# Patient Record
Sex: FEMALE
[De-identification: names, ages, dates, MRNs, and addresses within clinical notes are randomized per-mention and may not be internally consistent; named-entity substitution may affect disease eponyms.]

---

## 2019-04-05 LAB
BASOPHILS # BLD AUTO: 0 10*3/UL (ref 0–0.1)
BASOPHILS NFR BLD AUTO: 0.5 % (ref 0–1)
DEPRECATED NEUTROPHILS # BLD AUTO: 4.6 10*3/UL (ref 2.1–6.9)
EOSINOPHIL # BLD AUTO: 0.1 10*3/UL (ref 0–0.4)
EOSINOPHIL NFR BLD AUTO: 1.4 % (ref 0–6)
ERYTHROCYTE [DISTWIDTH] IN CORD BLOOD: 14.2 % (ref 11.7–14.4)
HCT VFR BLD AUTO: 46.1 % (ref 34.2–44.1)
HGB BLD-MCNC: 15 G/DL (ref 12–16)
LYMPHOCYTES # BLD: 2.1 10*3/UL (ref 1–3.2)
LYMPHOCYTES NFR BLD AUTO: 27.9 % (ref 18–39.1)
MCH RBC QN AUTO: 31.7 PG (ref 28–32)
MCHC RBC AUTO-ENTMCNC: 32.5 G/DL (ref 31–35)
MCV RBC AUTO: 97.5 FL (ref 81–99)
MONOCYTES # BLD AUTO: 0.6 10*3/UL (ref 0.2–0.8)
MONOCYTES NFR BLD AUTO: 7.9 % (ref 4.4–11.3)
NEUTS SEG NFR BLD AUTO: 61.9 % (ref 38.7–80)
PLATELET # BLD AUTO: 206 X10E3/UL (ref 140–360)
RBC # BLD AUTO: 4.73 X10E6/UL (ref 3.6–5.1)

## 2019-04-09 ENCOUNTER — HOSPITAL ENCOUNTER (OUTPATIENT)
Dept: HOSPITAL 88 - OR | Age: 64
Discharge: HOME | End: 2019-04-09
Attending: INTERNAL MEDICINE
Payer: COMMERCIAL

## 2019-04-09 VITALS — SYSTOLIC BLOOD PRESSURE: 133 MMHG | DIASTOLIC BLOOD PRESSURE: 86 MMHG

## 2019-04-09 DIAGNOSIS — D12.2: ICD-10-CM

## 2019-04-09 DIAGNOSIS — K44.9: ICD-10-CM

## 2019-04-09 DIAGNOSIS — I10: ICD-10-CM

## 2019-04-09 DIAGNOSIS — K21.0: ICD-10-CM

## 2019-04-09 DIAGNOSIS — K64.8: ICD-10-CM

## 2019-04-09 DIAGNOSIS — Z01.810: ICD-10-CM

## 2019-04-09 DIAGNOSIS — D12.4: ICD-10-CM

## 2019-04-09 DIAGNOSIS — E78.5: ICD-10-CM

## 2019-04-09 DIAGNOSIS — J44.9: ICD-10-CM

## 2019-04-09 DIAGNOSIS — K29.80: ICD-10-CM

## 2019-04-09 DIAGNOSIS — K29.70: Primary | ICD-10-CM

## 2019-04-09 DIAGNOSIS — K57.30: ICD-10-CM

## 2019-04-09 DIAGNOSIS — Z01.812: ICD-10-CM

## 2019-04-09 PROCEDURE — 93005 ELECTROCARDIOGRAM TRACING: CPT

## 2019-04-09 PROCEDURE — 36415 COLL VENOUS BLD VENIPUNCTURE: CPT

## 2019-04-09 PROCEDURE — 45385 COLONOSCOPY W/LESION REMOVAL: CPT

## 2019-04-09 PROCEDURE — 85025 COMPLETE CBC W/AUTO DIFF WBC: CPT

## 2019-04-09 PROCEDURE — 43239 EGD BIOPSY SINGLE/MULTIPLE: CPT

## 2019-04-09 PROCEDURE — 45384 COLONOSCOPY W/LESION REMOVAL: CPT

## 2019-04-09 NOTE — XMS REPORT
Clinical Summary

                             Created on: 2019



German Worthington

External Reference #: [id number]

: 1955

Sex: Female



Demographics







                          Address                   [address] [place]  [address]

 

                          Home Phone                [phone]

 

                          Preferred Language        English

 

                          Marital Status            Single

 

                          Rastafarian Affiliation     Unknown

 

                          Race                      White

 

                          Ethnic Group              Non-





Author







                          Author                    [facility]

 

                          Organization              [facility]

 

                          Address                   Unknown

 

                          Phone                     Unavailable







Support







                Name            Relationship    Address         Phone

 

                Cori Parsons            Unknown         [phone]







Care Team Providers







                    Care Team Member Name    Role                Phone

 

                    Provider, Unknown    PCP                 Unavailable







Allergies

No Known Allergies



Medications







                          End Date                  Status



              Medication     Sig          Dispensed     Refills      Start  



                                         Date  

 

                                                    Active



                     simvastatin (ZOCOR) 40 MG     Take 40 mg by       0   



                           tablet                    mouth every     



                                         evening.     

 

                                                    Active



                     aspirin (ECOTRIN) 81 MG     Take 81 mg by       0   



                           enteric coated tablet     mouth daily.     

 

                                                    Active



                     levothyroxine (SYNTHROID,     Take 137 mcg        0   



                           LEVOXYL) 137 mcg tablet     by mouth     



                                         daily.     

 

                                                    Active



                     hydroCHLOROthiazide     Take 12.5 mg        0   



                           (MICROZIDE) 12.5 mg       by mouth     



                           capsule                   daily.     

 

                          10/22/2018                Discontinued



                     methylPREDNISolone     Take 4 mg by        0   



                           (MEDROL DOSEPAK) 4 mg     mouth 2 (two)     



                           tablet                    times a day.     



                                         follow     



                                         package     



                                         directions     

 

                          10/22/2018                Discontinued



                     albuterol (PROAIR     Inhale 2            0   



                           HFA,PROVENTIL             puffs every 4     



                           HFA,VENTOLIN HFA) 90      (four) hours     



                           mcg/actuation inhaler     as needed for     



                                         wheezing.     

 

                          10/22/2018                Discontinued



                     cefdinir (OMNICEF) 300 MG     Take 300 mg         0   



                           capsule                   by mouth 2     



                                         (two) times a     



                                         day.     

 

                          10/22/2018                Discontinued



                     azithromycin (ZITHROMAX)     Take 250 mg         0   



                           250 MG tablet             by mouth     



                                         daily. Take 2     



                                         tablets the     



                                         first day,     



                                         then 1 tablet     



                                         daily for 4     



                                         days.     

 

                          2018                



              tiotropium (SPIRIVA) 18     Place 1 puff     30 capsule     2            10/23/201  



                     mcg per inhalation     (1 capsule          8  



                           capsule                   total) into     



                                         inhaler and     



                                         inhale once     



                                         daily for 30     



                                         days.     

 

                          2018                



              nicotine (NICODERM CQ) 21     Place 1 patch     30 patch     2            10/22/201  



                     mg/24 hr            on the skin         8  



                                         daily for 30     



                                         days.     

 

                          10/27/2018                



              methylPREDNISolone     follow       21 tablet     0            10/22/201  



                     (MEDROL, YISEL,) 4 mg     package             8  



                           tablet                    directions     

 

                          10/29/2018                



              levoFLOXacin (LEVAQUIN)     Take 1 tablet     7 tablet     0            10/22/201  



                     500 MG tablet       (500 mg             8  



                                         total) by     



                                         mouth daily     



                                         for 7 days.     

 

                          2018                



              fluticasone-vilanterol     Inhale 1     30 each      2            10/22/201  



                     (BREO ELLIPTA) 100-25     inhalations         8  



                           mcg/dose blister with     daily for 30     



                           device powder for         days.     



                                         inhalation      

 

                          2018                



              albuterol (PROAIR     Inhale 2     18 g         2            10/22/201  



                     HFA,PROVENTIL       puffs every 6       8  



                           HFA,VENTOLIN HFA) 90      (six) hours     



                           mcg/actuation inhaler     as needed for     



                                         wheezing for     



                                         up to 30     



                                         days.     







Active Problems







 



                           Problem                   Noted Date

 

 



                           COPD (chronic obstructive pulmonary disease)     10/20/2018







Encounters







                          Care Team                 Description



                     Date                Type                Specialty  

 

                                        



Sylvie Chowdhury MD                   



                     10/20/2018          [facility]  



                           -                         Encounter   



                                         10/22/2018    



after 2018



Immunizations







  



                     Name                Dates Previously Given     Next Due

 

  



                           FLUCELVAX QUAD PF (0.5mL     10/22/2018 (Deferred: ) 



                                         syringe)  







Social History







                                        Date



                 Tobacco Use     Types           Packs/Day       Years Used 

 

                                         



                     Current Every Day Smoker     Cigarettes          1  

 

    



                                         Smokeless Tobacco: Never   



                                         Used   









                                        Tobacco Cessation: Counseling Given: Yes











   



                 Alcohol Use     Drinks/Week     oz/Week         Comments

 

   



                                         No   









 



                           Sex Assigned at Birth     Date Recorded

 

 



                                         Not on file 









                                        Industry



                           Job Start Date            Occupation 

 

                                        Not on file



                           Not on file               Not on file 









                                        Travel End



                           Travel History            Travel Start 

 





                                         No recent travel history available.







Last Filed Vital Signs







                                        Time Taken



                           Vital Sign                Reading 

 

                                        10/22/2018  3:22 PM CDT



                           Blood Pressure            141/89 

 

                                        10/22/2018  3:22 PM CDT



                           Pulse                     80 

 

                                        10/22/2018  3:22 PM CDT



                           Temperature               36.7 C (98 F) 

 

                                        10/22/2018  3:22 PM CDT



                           Respiratory Rate          18 

 

                                        10/22/2018  3:22 PM CDT



                           Oxygen Saturation         96% 

 

                                        -



                           Inhaled Oxygen            - 



                                         Concentration  

 

                                        10/20/2018  8:24 PM CDT



                           Weight                    93 kg (205 lb) 

 

                                        10/20/2018  8:24 PM CDT



                           Height                    154.9 cm (5' 1") 

 

                                        10/20/2018  8:24 PM CDT



                           Body Mass Index           38.73 







Plan of Treatment







   



                 Health Maintenance     Due Date        Last Done       Comments

 

   



                           CERVICAL CANCER SCREENING     1977  

 

   



                           BREAST CANCER SCREENING     2006  

 

   



                           COLON CANCER SCREENING     2006  

 

   



                           SHINGLES VACCINES (#1)     2006  

 

   



                           INFLUENZA VACCINE         2019  







Procedures







                                        Comments



                 Procedure Name     Priority        Date/Time       Associated Diagnosis 

 

                                        



Results for this procedure are in the results section.



                     ESTIMATED GFR       Routine             10/22/2018  



                                         6:05 AM CDT  

 

                                        



Results for this procedure are in the results section.



                     B NATRIURETIC PEPTIDE     Routine             10/22/2018  



                                         6:05 AM CDT  

 

                                        



Results for this procedure are in the results section.



                     HEMOGLOBIN A1C      Routine             10/22/2018  



                                         6:05 AM CDT  

 

                                        



Results for this procedure are in the results section.



                     BASIC METABOLIC PANEL     Routine             10/22/2018  



                                         6:05 AM CDT  

 

                                        



Results for this procedure are in the results section.



                     CBC HEMOGRAM        Routine             10/22/2018  



                                         6:05 AM CDT  

 

                                        



Results for this procedure are in the results section.



                     GRAM STAIN          Routine             10/21/2018  



                                         12:45 PM CDT  

 

                                        



Results for this procedure are in the results section.



                     SPUTUM CULTURE      Routine             10/21/2018  



                                         12:45 PM CDT  

 

                                        



Results for this procedure are in the results section.



                     ECHOCARDIOGRAM 2D     Routine             10/21/2018  



                           COMPLETE W MMODE SPECTRAL      12:24 PM CDT  



                                         COLOR DOPPLER ([id number])    

 

                                        



Results for this procedure are in the results section.



                     TROPONIN            Timed               10/21/2018  



                                         11:35 AM CDT  

 

                                        



Results for this procedure are in the results section.



                     RESPIRATORY PATHOGEN     Routine             10/21/2018  



                           PANEL                     10:38 AM CDT  

 

                                        



Results for this procedure are in the results section.



                     TROPONIN            Routine             10/21/2018  



                                         6:11 AM CDT  

 

                                        



Results for this procedure are in the results section.



                     ESTIMATED GFR       Routine             10/21/2018  



                                         6:11 AM CDT  

 

                                        



Results for this procedure are in the results section.



                     BASIC METABOLIC PANEL     Routine             10/21/2018  



                                         6:11 AM CDT  

 

                                        



Results for this procedure are in the results section.



                     CBC HEMOGRAM        Routine             10/21/2018  



                                         6:11 AM CDT  

 

                                        



Results for this procedure are in the results section.



                     TROPONIN            Routine             10/20/2018  



                                         8:14 PM CDT  



after 2018



Results

* Estimated GFR (10/22/2018  6:05 AM CDT)



Only the most recent of 2 results within the time period is included.





   



                 Component       Value           Ref Range       Performed At

 

   



                 Estimated GFR     >=90            mL/min/1.73 m2     [facility] DEPARTMENT OF



                           Comment:                  PATHOLOGY AND



                           CatergoryUnitsInte      GENOMIC MEDICINE



                                         rpretation  



                                         G1  



                                         >=90 Normal or high  



                                         G2  



                                         60-89Mildly decreased  



                                         [id number]  



                                         Mildly to moderately  



                                         decreased  



                                         [id number]  



                                         Moderately to severely  



                                         decreased  



                                         G4  



                                         15-29Severely  



                                         decreased  



                                         G5  



                                         <15Kidney failure  



                                         The eGFR was calculated using  



                                         the Chronic Kidney Disease  



                                         Epidemiology Collaboration  



                                         (CKD-EPI) equation.  



                                         Interpretation is based on  



                                         recommendations of the  



                                         National Kidney  



                                         Foundation-Kidney Disease  



                                         Outcomes Quality  



                                         Initiative (NKF-KDOQI)  



                                         published in 2014.  













                                         Specimen

 





                                         Plasma specimen









   



                 Performing Organization     Address         City/[facility]/Zipcode     Phone Number

 

   



                     [facility]     [address] [place] [address] 



                                         PATHOLOGY AND GENOMIC   



                                         MEDICINE   





* CBC hemogram (10/22/2018  6:05 AM CDT)



Only the most recent of 2 results within the time period is included.





   



                 Component       Value           Ref Range       Performed At

 

   



                 WBC             8.8             4.2 - 11.0 k/uL     [facility] DEPARTMENT OF



                                         PATHOLOGY AND



                                         GENOMIC MEDICINE

 

   



                 RBC             4.25            4.04 - 5.86 m/uL     [facility] DEPARTMENT OF



                                         PATHOLOGY AND



                                         GENOMIC MEDICINE

 

   



                 HGB             13.5            11.5 - 15.3 g/dL     [facility] DEPARTMENT OF



                                         PATHOLOGY AND



                                         GENOMIC MEDICINE

 

   



                 HCT             41.3            34.0 - 45.0 %     [facility] DEPARTMENT OF



                                         PATHOLOGY AND



                                         GENOMIC MEDICINE

 

   



                 MCV             97.2            80.0 - 98.0 fL     [facility] DEPARTMENT OF



                                         PATHOLOGY AND



                                         GENOMIC MEDICINE

 

   



                 MCH             31.8            27.0 - 34.0 pg     [facility] DEPARTMENT OF



                                         PATHOLOGY AND



                                         GENOMIC MEDICINE

 

   



                 MCHC            32.7            31.5 - 36.5 g/dL     [facility] DEPARTMENT OF



                                         PATHOLOGY AND



                                         GENOMIC MEDICINE

 

   



                 RDW - SD        45.4            37.0 - 51.0 fL     [facility] DEPARTMENT OF



                                         PATHOLOGY AND



                                         GENOMIC MEDICINE

 

   



                 MPV             11.5 (H)        7.4 - 10.4 fL     [facility] DEPARTMENT OF



                                         PATHOLOGY AND



                                         GENOMIC MEDICINE

 

   



                 Platelet count     287             150 - 400 k/uL     [facility] DEPARTMENT OF



                                         PATHOLOGY AND



                                         GENOMIC MEDICINE

 

   



                 Nucleated RBC     0.00            /100 WBC        [facility] DEPARTMENT OF



                                         PATHOLOGY AND



                                         GENOMIC MEDICINE













                                         Specimen

 





                                         Blood









   



                 Performing Organization     Address         City/[facility]/Zipcode     Phone Number

 

   



                     [facility]     [address] [place] [address] 



                                         PATHOLOGY AND GENOMIC   



                                         MEDICINE   





* B natriuretic peptide (10/22/2018  6:05 AM CDT)





   



                 Component       Value           Ref Range       Performed At

 

   



                 BNP             106 (H)         0 - 100 pg/mL     [facility] DEPARTMENT OF



                                         PATHOLOGY AND



                                         GENOMIC MEDICINE













                                         Specimen

 





                                         Blood









   



                 Performing Organization     Address         City/[facility]/Zipcode     Phone Number

 

   



                     [facility]     [address] [place] [address] 



                                         PATHOLOGY AND GENOMIC   



                                         MEDICINE   





* Hemoglobin A1c (10/22/2018  6:05 AM CDT)





   



                 Component       Value           Ref Range       Performed At

 

   



                 Hemoglobin A1C     6.2 (H)         4.0 - 6.0 %     [facility] DEPARTMENT OF



                           Comment:                  PATHOLOGY AND



                           ******************************      GENOMIC MEDICINE



                                         ***********************  



                                         Less than 6% -  



                                         Goal of therapy for Type II  



                                         Diabetes  



                                         Less than  



                                         7%-Goal of  



                                         therapy for Type I Diabetes  



                                         Less than  



                                         8%-Accepta  



                                         ble control for Type I or Type

  



                                           



                                         II Diabetes  



                                         Greater than  



                                         8%-Unacceptabl  



                                         e control; action indicated.  



                                           



                                         ([id number])  













                                         Specimen

 





                                         Blood









   



                 Performing Organization     Address         City/[facility]     Phone Number

 

   



                     [place] [address] [place] [address] 



                                         PATHOLOGY AND [facility]   



                                         MEDICINE   





* Basic metabolic panel (10/22/2018  6:05 AM CDT)



Only the most recent of 2 results within the time period is included.





   



                 Component       Value           Ref Range       Performed At

 

   



                 Sodium          140             135 - 150 mEq/L     [facility] DEPARTMENT OF



                                         PATHOLOGY AND



                                         GENOMIC MEDICINE

 

   



                 Potassium       3.7             3.5 - 5.0 mEq/L     [facility] DEPARTMENT OF



                                         PATHOLOGY AND



                                         GENOMIC MEDICINE

 

   



                 Chloride        99              98 - 112 mEq/L     [facility] DEPARTMENT OF



                                         PATHOLOGY AND



                                         GENOMIC MEDICINE

 

   



                 CO2             27              24 - 31 mmol/L     [facility] DEPARTMENT OF



                                         PATHOLOGY AND



                                         GENOMIC MEDICINE

 

   



                 Anion gap       14@ANIO         7 - 15 mEq/L     [facility] DEPARTMENT OF



                                         PATHOLOGY AND



                                         GENOMIC MEDICINE

 

   



                 BUN             22 (H)          7 - 18 mg/dL     [facility] DEPARTMENT OF



                                         PATHOLOGY AND



                                         GENOMIC MEDICINE

 

   



                 Creatinine      0.60            0.50 - 0.90 mg/dL     [facility] DEPARTMENT OF



                                         PATHOLOGY AND



                                         GENOMIC MEDICINE

 

   



                 Glucose         237 (H)         65 - 100 mg/dL     [facility] DEPARTMENT OF



                                         PATHOLOGY AND



                                         GENOMIC MEDICINE

 

   



                 Calcium         9.1             8.8 - 10.2 mg/dL     [facility] DEPARTMENT OF



                                         PATHOLOGY AND



                                         [facility] MEDICINE













                                         Specimen

 





                                         Plasma specimen









   



                 Performing Organization     Address         City/[facility]/Zipcode     Phone Number

 

   



                     [place] [address] [place] [address] 



                                         PATHOLOGY AND GENOMIC   



                                         MEDICINE   





* Sputum culture (10/21/2018 12:45 PM CDT)





   



                 Component       Value           Ref Range       Performed At

 

   



                     Sputum culture isolate     Normal oral madhu isolated.      [facility] DEPARTMENT OF



                           Comment:                  PATHOLOGY AND



                           Specimen Information      GENOMIC MEDICINE



                                         Specimen Source: Sputum  



                                         Specimen Site: Expectorated  













                                         Specimen

 





                                         Sputum - Expectorated









   



                 Performing Organization     Address         City/[facility]     Phone Number

 

   



                     [facility] DEPARTMENT OF     [address] [place] [address] 



                                         PATHOLOGY AND GENOMIC   



                                         MEDICINE   





* Gram stain (10/21/2018 12:45 PM CDT)





   



                 Component       Value           Ref Range       Performed At

 

   



                     Gram stain isolate     Few epithelial cells      [facility] DEPARTMENT OF



                           Rare WBC's                PATHOLOGY AND



                           Occasional Gram positive cocci      GENOMIC MEDICINE



                                         in pairs  



                                         Few Gram positive rods  



                                         Few Gram negative rods  



                                         Comment:  



                                         Specimen Information  



                                         Specimen Source: Sputum  



                                         Specimen Site: Expectorated  













                                         Specimen

 





                                         Sputum - Expectorated









   



                 Performing Organization     Address         City/[facility]     Phone Number

 

   



                     [facility] DEPARTMENT OF     [address] [place] [address] 



                                         PATHOLOGY AND GENOMIC   



                                         MEDICINE   





* Echocardiogram complete w contrast and 3D if needed (10/21/2018 12:24 PM CDT)





   



                 Component       Value           Ref Range       Performed At

 

   



                 Ao Root Diameter     3.31            cm              [facility]

 

   



                 AoV Area, Vmax     2.81            cm2             [facility]

 

   



                 AoV Area, VTI     2.77            cm2             [facility]

 

   



                 AoV Mean PG     5.78            mmHg            [facility]

 

   



                 AoV Peak PG     8.80            mmHg            [facility]

 

   



                 AoV Vmax        1.49            m/s             [facility]

 

   



                 AoV VTI         0.32            m               [facility]

 

   



                 IVS,d           0.87            cm              [facility]

 

   



                     IVS/LVPW,2D         0.98                [facility]

 

   



                 Left Atrium Dimension     3.26            cm              [facility]



                                         Anterior   

 

   



                 LV,d            5.10            cm              [facility]

 

   



                 LV EF,2D        69.93           %               [facility]

 

   



                 LV,s            3.42            cm              [facility]

 

   



                 LVOT area       3.43            cm2             [facility]

 

   



                 LVOT Diam,S     2.09            cm              [facility]

 

   



                 LVOT Vmax       1.27            m/s             [facility]

 

   



                 LVOT VTI        0.24            m               [facility]

 

   



                 LVPWD,d         0.88            cm              [facility]

 

   



                 RVOT Vmax       0.73            m/s             [facility]

 

   



                     MV E A ratio        1.02                [facility]

 

   



                 E wave decelartion time     156.69          msec            [facility]

 

   



                 MV Peak A Vitor     1.03            m/s             [facility]

 

   



                 MV valve area p 1/2     4.84            cm2             [facility]



                                         method   

 

   



                 MV Peak E Vitor     1.05            m/s             [facility]

 

   



                 MV stenosis pressure 1/2     45.44           ms              [facility]



                                         time   

 

   



                 AV LVOT peak gradient     5.87            mmHg            [facility]

 

   



                 Ao Root Diameter     3.31            cm              [facility]

 

   



                 MV mean gradient     1.84            mmHg            [facility]

 

   



                 LV SYS VOL      48.00           ml              [facility]

 

   



                 LV MUSTAFA VOL     123.79          ml              [facility]

 

   



                 LV SV Teich 2D     75.79           ml              [facility]

 

   



                 LV Vol s Teich PSAX     48.00           ml              [facility]

 

   



                 LVOT CO         7.39            l/min           [facility]

 

   



                 LVOT HR for LVOT CO     84.76           bpm             [facility]

 

   



                 MR peak grad     6.51            mmHg            [facility]

 

   



                 MV Vmax         1.28            m               [facility]

 

   



                 MV VTI Tips     0.27            m               [facility]

 

   



                 RVOT pk grad     2.11            mmHg            [facility]

 

   



                     AoV Vmn             1.14                [facility]

 

   



                     IVS s 2D            1.08                [facility]

 

   



                     LV FS Teich 2D      33.00               [facility]

 

   



                     MV AE ratio         0.98                [facility]

 

   



                     LV FS Cube 2D       33.00               [facility]

 

   



                     LVOT Vmn            0.75                [facility]

 

   



                 Aov area Vmn     2.54            cm2             [facility]

 

   



                 LVOT mean grad     2.64            mmHg            [facility]

 

   



                     MAX Pred HR         157.33              [facility]

 

   



                     85 of MPHR          133.73              [facility]

 

   



                 Calc MPHR       157.33          bpm             [facility]

 

   



                 IVS pct thck PLAX     24.85           %               [facility]

 

   



                 LV SV Cube 2D     92.74           ml              [facility]

 

   



                 LV vol d cube 2D     132.63          ml              [facility]

 

   



                 LV vol s cube 2D     39.88           ml              [facility]

 

   



                 LVPW pct thck PLAX     21.70           %               [facility]

 

   



                 LVPW s PLAX     1.07            cm              [facility]

 

   



                 MV Decel slope     6.71            m/s2            [facility]

 

   



                     Pred Exer Dur R1     7.62                [facility]

 

   



                     Pred METS R1        6.55                [facility]

 

   



                 LA Vol MOD A4C     37.87           ml              [facility]

 

   



                 Velocity Ratio (V1/V2)     0.85            m/s             [facility]

 

   



                 EF              61.22           %               [facility]

 

   



                     E/A ratio           1.02                [facility]









 



                           Narrative                 Performed At

 

 



                           This result has an attachment that is not available.     [facility]



                                          The left ventricular chamber size is normal. Left ventricular systolic 



                                         function is normal. Left Ventricular ejection fraction is 55 - 60%. Normal 



                                         left ventricular regional wall motion. 



                                          Normal right ventricular size and global function. 



                                          Spectral Doppler shows impaired relaxation pattern of LV diastolic 



                                         filling. Normal LV filling pressure. 



                                          No valvular lesions seen. 









   



                 Performing Organization     Address         City/State/Zipcode     Phone Number

 

   



                     [facility]            [address] [place] [address] 





* Troponin (10/21/2018 11:35 AM CDT)



Only the most recent of 3 results within the time period is included.





   



                 Component       Value           Ref Range       Performed At

 

   



                 Troponin        <0.30           0.00 - 0.30 ng/mL     [facility] DEPARTMENT OF



                           Comment:                  PATHOLOGY AND



                           0.11 - 1.49               GENOMIC MEDICINE



                                         ng/mlMay  



                                         indicate increased risk of  



                                         acute  



                                           



                                          coronary  



                                         syndrome.  



                                           



                                           



                                         >=1.5  



                                         ng/ml  



                                         Consistent with acute  



                                         myocardial  



                                           



                                          infarction.  



                                           



                                           



                                           



                                           



                                           



                                         The diagnostic value of a  



                                         single normal or  



                                         non-diagnostic  



                                         result is  



                                         questionable.Serial  



                                         samples at 2-6 hour intervals  



                                         are required to rule out acute  



                                         myocardial  



                                         injury.  



                                           



                                           













                                         Specimen

 





                                         Plasma specimen









   



                 Performing Organization     Address         City/State/Zipcode     Phone Number

 

   



                     [facility] DEPARTMENT OF     [address] [place] [address] 



                                         PATHOLOGY AND GENOMIC   



                                         MEDICINE   





* Respiratory pathogen panel (10/21/2018 10:38 AM CDT)





   



                 Component       Value           Ref Range       Performed At

 

   



                     Respiratory pathogen     Negative for all pathogens      [facility] DEPARTMENT OF



                     panel               tested:             PATHOLOGY AND



                           Negative for Adenovirus      GENOMIC MEDICINE



                                         Negative for Coronavirus HKU1  



                                         Negative for Coronavirus NL63  



                                         Negative for Coronavirus 229E  



                                         Negative for Coronavirus OC43  



                                         Negative for Human  



                                         Metapneumovirus  



                                         Negative for  



                                         Rhinovirus/Enterovirus  



                                         Negative for Influenza A  



                                         Negative for Influenza A/H1  



                                         Negative for Influenza A/H3  



                                         Negative for Influenza  



                                         A/H1-2009  



                                         Negative for Influenza B  



                                         Negative for Parainfluenza  



                                         Virus 1  



                                         Negative for Parainfluenza  



                                         Virus 2  



                                         Negative for Parainfluenza  



                                         Virus 3  



                                         Negative for Parainfluenza  



                                         Virus 4  



                                         Negative for Respiratory  



                                         Syncytial Virus  



                                         Negative for Bordetella  



                                         pertussis  



                                         Negative for Chlamydophila  



                                         pneumoniae  



                                         Negative for Mycoplasma  



                                         pneumoniae  



                                         This real-time PCR assay  



                                         detects the presence of  



                                         nucleic  



                                         acids (RNA or DNA) for the  



                                         respiratory pathogens  



                                         listed.  



                                         A result of "Not-detected"  



                                         does not exclude the  



                                         possibility  



                                         of the presence of one or more  



                                         pathogens at concentrations  



                                         less than the detectable  



                                         limits of the assay.  



                                         Comment:  



                                         Specimen Information  



                                         Specimen Source: Nares  



                                         Specimen Site: Other  













                                         Specimen

 





                                         Nares - Other- Detailed



                                         Description Required









   



                 Performing Organization     Address         City/State/Zipcode     Phone Number

 

   



                     [facility] DEPARTMENT OF     [address] [place] [address] 



                                         PATHOLOGY AND GENOMIC   



                                         MEDICINE   





after 2018



Insurance







     



            Payer      Benefit     Subscriber ID     Type       Phone      Address



                                         Plan /    



                                         Group    

 

     



                 PAULINO EXCHANGE     PAULINO          xxxxxxxxxx      Exchange  



                                         [employer]    



                                         E EXCHANGE    









     



            Guarantor Name     Account     Relation to     Date of     Phone      Billing Address



                     Type                Patient             Birth  

 

     



            German Worthington     Personal/F     Self       1955     [phone]     [address]               (Home)              [place] [address]







Advance Directives





Patient has advance care planning documents on file. For more information, deedee
e contact:



[address] [place] [address]

## 2020-11-18 NOTE — XMS REPORT
[facility] Services Summary

                             Created on: 2019



German Worthington

External Reference #: [id number]

: 1955

Sex: Female



Demographics







                          Address                   [address] [place]  [address]

 

                          Home Phone                [phone]

 

                          Preferred Language        English

 

                          Marital Status            M

 

                          Restorationism Affiliation     Unknown

 

                          Race                      Unknown

 

                          Ethnic Group              Unknown





Author







                          Author                    Admin, Dumont

 

                          Organization              [facility]

 

                          Address                   [address]

[place]  [address]



 

                          Phone                     [phone]







Allergies, Adverse Reactions, Alerts







           Allergy Name    Reaction Description    Start Date    Severity    Status     Provider

 

           No Known Allergies                                         Thu Carrero CMA







Conditions or Problems







        Problem Name    Problem Code    Onset Date    Status    Entry Date    Provider    Comment    Standard

 Description                            Annotate

 

           Abnormal mammogram, right breast    793.80         Active         Fredrick Laura MD                               Abnormal mammogram, unspecified     

 

          Allergic rhinitis    477.9         Active        Fredrick Laura MD      

                          Allergic rhinitis, cause unspecified     

 

        Annual exam    V70.0        Active        Fredrick Laura MD            Routine

 general medical examination at a health care facility     

 

           Colon Cancer Screening    V76.51         Active         Fredrick Laura MD                                      Screening for malignant neoplasms of colon     

 

           Mammogram, Screening    V76.12         Active         Fredrick Laura MD

                                        Other screening mammogram     

 

        Tobacco use            2018/11/15    Active    2018/11/15    Fredrick Laura MD            Tobacco use

 disorder                                

 

           Vaccine against flu/influenza    V04.8          Active         Fredrick Laura MD                                            Need for prophylactic vaccination and inoculation against other viral

 diseases                                

 

        BMI 37.0-37.9                Active        Isaac Goldberg MD            Body Mass

 Index 37.0-37.9, adult                  

 

        Depression    311         Active        Isaac Goldberg MD            Depressive

 disorder, not elsewhere classified      

 

        Sciatica, right    724.3        Active        Isaac Goldberg MD            Sciatica

                                         

 

           Hyperlipidemia    272.4          Active         Munir Mcdaniels MD

                                        Other and unspecified hyperlipidemia     

 

          Hypertension    401.9         Active        Munir Mcdaniels MD    

                          Unspecified essential hypertension     

 

           Hypothyroidism    244.9          Active         Munir Mcdaniels MD

                                        Unspecified hypothyroidism     

 

           Low back pain, chronic    724.2          Active         Munir Mcdaniels MD                                Lumbago              

 

        Obesity                Active        Munir Mcdaniels MD            Obesity,

 unspecified                             

 

          Flu vaccine    ICD-V04.81    2017/10/12        Inactive    Jimmie Elizalde MD R3    

                                         

 

           Screening, diabetes mellitus    ICD-V77.1        Inactive    Jimmie Elizalde MD R3                              

 

        Flu vaccine    V04.81    2017/10/12    Resolved        Jimmie Elizalde MD R3            Need

 for prophylactic vaccination and inoculation against influenza     

 

           Screening, diabetes mellitus    V77.1          Resolved        Jimmie Elizalde MD R3                                  Screening for diabetes mellitus     







Medication List







        Medication    Instructions    Start Date    Stop Date    Generic Name    NDC     Status    Provider

                                        Patient Instruction

 

             CETIRIZINE HCL 10 MG ORAL TABLET    1 tab By Mouth daily                     CETIRIZINE

 HCL         25690131786    Active       Fredrick Laura MD                 Active

 

                          CYCLOBENZAPRINE HCL 10 MG ORAL TABLET    1 By Mouth three times a day as needed for

 muscle spasm                   CYCLOBENZAPRINE HCL    01672161608    Active     Fredrick Laura MD                                           Active

 

                          FLUTICASONE PROPIONATE 50 MCG/ACT NASAL SUSPENSION    1 spray in each nostril daily

                          FLUTICASONE PROPIONATE    88887919542    Active     Fredrick Laura MD 

                                                    Active

 

                          POTASSIUM CHLORIDE CHANDNI ER 10 MEQ ORAL TABLET EXTENDED RELEASE    1 by mouth every

 day                      POTASSIUM CHLORIDE CHANDNI CR    06886738154    Active     Fredrick Laura MD                                                 Active

 

             HYDROCHLOROTHIAZIDE 12.5 MG ORAL CAPSULE    1 by mouth every day    2017/10/12                 HYDROCHLOROTHIAZIDE

             91597372303    Active       Fredrick Laura MD                 Active

 

             ASPIRIN 81 MG ORAL TABLET DELAYED RELEASE    1 by mouth every day                     

ASPIRIN      54826773600    Active       Munir Mcdaniels MD                 Active

 

                LEVOTHYROXINE SODIUM 137 MCG ORAL TABLET    One tab by mouth daily            

           LEVOTHYROXINE SODIUM    68032758170    Active     Fredrick Laura MD               Active

 

             LIPITOR 40 MG ORAL TABLET    1 by mouth every pm                     ATORVASTATIN CALCIUM

             44459378301    Active       Fredrick Laura MD                 Active

 

                          NAPROXEN 500 MG ORAL TABLET    1 by mouth twice a day as needed for pain and inflammation

                    NAPROXEN    93148396342    Active    Fredrick Laura MD            Active

 

                          PREDNISONE 10 MG ORAL TABLET    Take 3 tab By Mouth BID x1 day, 1 tab By Mouth TID

 x1 day, 1 tab By Mouth Twice a Day x1 day, 1 tab By Mouth daily x3 days        PREDNISONE 10 MG ORAL TABLET    972542       PREDNISONE    Inactive

 

             LYRICA 75 MG ORAL CAPSULE    take 3 times daily        LYRICA 75

 MG ORAL CAPSULE                        PREGABALIN          Inactive

 

                    TRAMADOL HCL 50 MG ORAL TABLET    take Every 6 hours as needed for pain        TRAMADOL HCL 50 MG ORAL TABLET    889715       TRAMADOL HCL    Inactive

 

             FUROSEMIDE 20 MG ORAL TABLET    1 by mouth every am        FUROSEMIDE

 20 MG ORAL TABLET    844554              FUROSEMIDE          Inactive

 

             LISINOPRIL 20 MG ORAL TABLET    1 by mouth every day        LISINOPRIL

 20 MG ORAL TABLET    389024              LISINOPRIL          Inactive

 

                          PREDNISONE 10 MG ORAL TABLET    Take 3 tab By Mouth BID x1 day, 1 tab By Mouth TID

 x1 day, 1 tab By Mouth Twice a Day x1 day, 1 tab By Mouth daily x3 days        PREDNISONE    77162138509    No Longer Active    Fredrick Laura MD              Active



 

             LYRICA 75 MG ORAL CAPSULE    take 3 times daily        PREGABALIN

             94390830224    No Longer Active    Jimmie Elizalde MD R3                 Active

 

                    TRAMADOL HCL 50 MG ORAL TABLET    take Every 6 hours as needed for pain        TRAMADOL HCL    82869576237    No Longer Active    Jimmie Elizalde MD R3              

Active

 

             FUROSEMIDE 20 MG ORAL TABLET    1 by mouth every am        FUROSEMIDE

             44721229958    No Longer Active    Jimmie Elizalde MD R3                 Active

 

             LISINOPRIL 20 MG ORAL TABLET    1 by mouth every day        LISINOPRIL

             85668173593    No Longer Active    Jimmie Elizalde MD R3                 Active

 

             SIMVASTATIN 40 MG ORAL TABLET    1 by mouth every night                     SIMVASTATIN

             32298355639    No Longer Active    Jimmie Elizalde MD R3                 Active







Immunizations







                Vaccine         Administration Date    Value           Standard Description

 

                influenza immunization (Flu Vax) has been administered          given           influenza

 virus vaccine, unspecified formulation







Vital Signs







           Date       Name       Value      Unit       Range      Description

 

               blood pressure, diastolic    85         mm[Hg]                BP albright

 

               blood pressure, systolic    133        mm[Hg]                BP sys

 

               height E&M    62         [in_us]               Bdy height

 

               pulse rate E&M    96         /min                  Heart rate

 

               respiratory rate E&M    18         /min                  Resp rate

 

               temperature E&M    98.3       [degF]                Body temperature

 

               weight E&M    222.38     [lb_av]               Weight Measured

 

               blood pressure, diastolic    80         mm[Hg]                BP albright

 

               blood pressure, systolic    114        mm[Hg]                BP sys

 

               height E&M    62         [in_us]               Bdy height

 

               pulse rate E&M    93         /min                  Heart rate

 

               respiratory rate E&M    20         /min                  Resp rate

 

               temperature E&M    98.3       [degF]                Body temperature

 

               weight E&M    215.60     [lb_av]               Weight Measured

 

               blood pressure, diastolic    90         mm[Hg]                BP albright

 

               blood pressure, systolic    140        mm[Hg]                BP sys

 

               height E&M    62         [in_us]               Bdy height

 

               pulse rate E&M    97         /min                  Heart rate

 

               respiratory rate E&M    22         /min                  Resp rate

 

               temperature E&M    98.3       [degF]                Body temperature

 

               weight E&M    214.40     [lb_av]               Weight Measured







Diagnostic Results







           Date       Name       Value      Unit       Range      Description

 

                                        Lab Report: Basic Metabolic Panel (8), Lipid Panel, TSH - Chemistry 

 

               sodium, serum    142        mmol/L     134-144     

 

               thyroid stimulating hormone, serum    1.340      u[iU]/mL    0.450-4.500     

 

               very low density lipoproteins    36         mg/dL      5-40        

 

               carbon dioxide, venous blood    28         mmol/L     18-29       

 

               chloride, serum    98         mmol/L           

 

               triglyceride, serum, fasting    182        mg/dL      0-149       

 

               calcium, serum    9.6        mg/dL      8.7-10.3     

 

               urea nitrogen, blood    15         mg/dL      8-27        

 

                                        Lab Report: TSH+Free T4, Comp. Metabolic Panel (14), Lipid Panel, Hemogl ... - Chemistry

 

 

               alanine aminotransferase (SGPT), serum    28         U/L        0-32        

 

                                        Office Visit: Adult Followup Rm1 Fide  - Chemistry 

 

               blood glucose, fasting    80         mg/dL                  

 

                                        Lab Report: TSH+Free T4, Comp. Metabolic Panel (14), Lipid Panel, Hemogl ... - Chemistry

 

 

               protein, total, serum    6.6        g/dL       6.0-8.5     

 

               alkaline phosphatase, serum    71         U/L              

 

                                        Lab Report: Basic Metabolic Panel (8), Lipid Panel, TSH - Chemistry 

 

               LDL cholesterol, serum    203        mg/dL      0-99        

 

               urea nitrogen/creatinine ratio, serum    26                    12-28       

 

                                        Append: Adult Followup A1C  - Chemistry 

 

               hemoglobin A1C, blood, as % of total hemoglobin    5.8        %                      

 

                                        Lab Report: Basic Metabolic Panel (8), Lipid Panel, TSH - Chemistry 

 

               HDL cholesterol, serum    58         mg/dL      >39         

 

                                        Lab Report: Basic Metabolic Panel (8), Lipid Panel, TSH - Genetics/fertility 

 

               eGFR if African American    114        mL/min/1.73m2    >59         

 

                                        Lab Report: TSH+Free T4 - Chemistry 

 

               thyroxine, serum, free    1.80       ng/dL      0.82-1.77     

 

                                        Lab Report: TSH+Free T4, Comp. Metabolic Panel (14), Lipid Panel, Hemogl ... - Chemistry

 

 

               globulin, serum    2.2                   1.5-4.5     

 

                                        Lab Report: Basic Metabolic Panel (8), Lipid Panel, TSH - Chemistry 

 

               creatinine, serum    0.58       mg/dL      0.57-1.00     

 

                 Estimated Glomerular Filtration Rate (calc)    99           mL/min/1.73m2    >59 

                                         

 

                                        Lab Report: TSH+Free T4, Comp. Metabolic Panel (14), Lipid Panel, Hemogl ... - Chemistry

 

 

               albumin/globulin ratio, serum    2.0                   1.2-2.2     

 

                                        Lab Report: Basic Metabolic Panel (8), Lipid Panel, TSH - Chemistry 

 

               cholesterol, serum    297        mg/dL      100-199     

 

                                        Lab Report: TSH+Free T4, Comp. Metabolic Panel (14), Lipid Panel, Hemogl ... - Chemistry

 

 

               bilirubin, serum, total    0.3        mg/dL      0.0-1.2     

 

                                        Lab Report: Basic Metabolic Panel (8), Lipid Panel, TSH - Chemistry 

 

               blood glucose, random    92         mg/dL      65-99       

 

                                        Lab Report: TSH+Free T4, Comp. Metabolic Panel (14), Lipid Panel, Hemogl ... - Chemistry

 

 

               aspartate aminotransferase (SGOT), serum    27         U/L        0-40        

 

                                        Lab Report: Basic Metabolic Panel (8), Lipid Panel, TSH - Chemistry 

 

               potassium, serum    5.0        mmol/L     3.5-5.2     

 

                                        Lab Report: TSH+Free T4, Comp. Metabolic Panel (14), Lipid Panel, Hemogl ... - Chemistry

 

 

               albumin, serum    4.4        g/dL       3.6-4.8     







Encounters







             Date         Encounter    Provider     Code         Facility

 

                 10:41:21 CDT    Est Patient Exp Problem - 16878    Fredrick Laura MD    [id number]

                                        [facility]

 

                 15:44:06 CST    Est Patient Exp Problem - 62020    Fredrick Laura MD    CPT-52507

                                        [facility]

 

                     09:42:00 CST    Est Patient Exp Problem - 21362    Rudy Posey MD (res)         CPT-25929                 [facility]

 

                2018/03/15 12:06:23 CDT    Est Patient Exp Problem - 44741    Jimmie Elizalde MD R3    CPT-47950

                                        [facility]

 

                 09:21:57 CST    Est Patient Exp Problem - 02394    Jimmie Elizalde MD R3    CPT-67217

                                        [facility]

 

                2017/10/13 17:24:18 CDT    Est Patient Exp Problem - 17893    Jordyn Champion D.O.    CPT-74584

                                        [facility]

 

                 14:06:53 CDT    Est Patient Exp Problem - 49096    Jimmie Elizalde MD R3    CPT-72350

                                        [facility]

 

                 16:23:06 CDT    Est Patient Exp Problem - 34919    Isaac Goldberg MD    CPT-75714

                                        [facility]

 

                     15:32:31 CDT    New Patient Detailed - 00946    Munir Mcdaniels MD

                          CPT-20130                 [facility]







Procedures







             Code         Procedure Name    Date         Entry Date    Standard Description

 

                    CPT-39287           INFLUENZA VACCINE QUADRIVALENT 3 YRS PLUS IM     09:42:01 CST

                                           

 

                    CPT-08225           INFLUENZA VACCINE QUADRIVALENT 3 YRS PLUS IM    2017/10/13 17:24:18 CDT

                          2017/10/12 Yes